# Patient Record
Sex: MALE | Race: WHITE | NOT HISPANIC OR LATINO | URBAN - NONMETROPOLITAN AREA
[De-identification: names, ages, dates, MRNs, and addresses within clinical notes are randomized per-mention and may not be internally consistent; named-entity substitution may affect disease eponyms.]

---

## 2024-06-03 RX ORDER — BUSPIRONE HYDROCHLORIDE 10 MG/1
10 TABLET ORAL DAILY
COMMUNITY
Start: 2024-04-05

## 2024-06-03 RX ORDER — IBUPROFEN 200 MG
200 TABLET ORAL EVERY 6 HOURS PRN
COMMUNITY

## 2024-06-03 RX ORDER — HYDROXYZINE HYDROCHLORIDE 25 MG/1
1 TABLET, FILM COATED ORAL EVERY 6 HOURS PRN
COMMUNITY
Start: 2024-04-05

## 2024-06-03 RX ORDER — VALACYCLOVIR HYDROCHLORIDE 1 G/1
1000 TABLET, FILM COATED ORAL 2 TIMES DAILY
COMMUNITY
Start: 2024-03-22

## 2024-06-03 RX ORDER — LIDOCAINE 50 MG/G
1 PATCH TOPICAL EVERY 24 HOURS
COMMUNITY
Start: 2024-05-02 | End: 2024-06-05 | Stop reason: ALTCHOICE

## 2024-06-03 RX ORDER — BUPRENORPHINE HYDROCHLORIDE AND NALOXONE HYDROCHLORIDE DIHYDRATE 8; 2 MG/1; MG/1
1.75 TABLET SUBLINGUAL DAILY
COMMUNITY

## 2024-06-03 RX ORDER — OMEGA-3-ACID ETHYL ESTERS 1 G/1
2 CAPSULE, LIQUID FILLED ORAL 2 TIMES DAILY
COMMUNITY
End: 2024-06-05 | Stop reason: ALTCHOICE

## 2024-06-03 RX ORDER — LISINOPRIL 5 MG/1
5 TABLET ORAL DAILY
COMMUNITY
Start: 2024-03-22

## 2024-06-03 RX ORDER — ACETAMINOPHEN 500 MG
1 TABLET ORAL EVERY 6 HOURS PRN
COMMUNITY

## 2024-06-04 PROBLEM — E29.1 HYPOGONADISM IN MALE: Status: ACTIVE | Noted: 2024-06-04

## 2024-06-05 ENCOUNTER — OFFICE VISIT (OUTPATIENT)
Dept: UROLOGY | Facility: CLINIC | Age: 54
End: 2024-06-05
Payer: COMMERCIAL

## 2024-06-05 VITALS
DIASTOLIC BLOOD PRESSURE: 78 MMHG | HEIGHT: 71 IN | TEMPERATURE: 98.7 F | HEART RATE: 81 BPM | RESPIRATION RATE: 18 BRPM | SYSTOLIC BLOOD PRESSURE: 132 MMHG | BODY MASS INDEX: 31.64 KG/M2 | WEIGHT: 226 LBS | OXYGEN SATURATION: 98 %

## 2024-06-05 DIAGNOSIS — E29.1 HYPOGONADISM IN MALE: Primary | ICD-10-CM

## 2024-06-05 LAB
SL AMB  POCT GLUCOSE, UA: ABNORMAL
SL AMB LEUKOCYTE ESTERASE,UA: ABNORMAL
SL AMB POCT BILIRUBIN,UA: ABNORMAL
SL AMB POCT BLOOD,UA: ABNORMAL
SL AMB POCT CLARITY,UA: CLEAR
SL AMB POCT COLOR,UA: YELLOW
SL AMB POCT KETONES,UA: ABNORMAL
SL AMB POCT NITRITE,UA: ABNORMAL
SL AMB POCT PH,UA: 5.5
SL AMB POCT SPECIFIC GRAVITY,UA: 1.01
SL AMB POCT URINE PROTEIN: ABNORMAL
SL AMB POCT UROBILINOGEN: 0.2

## 2024-06-05 PROCEDURE — 99204 OFFICE O/P NEW MOD 45 MIN: CPT | Performed by: UROLOGY

## 2024-06-05 PROCEDURE — 81003 URINALYSIS AUTO W/O SCOPE: CPT | Performed by: UROLOGY

## 2024-06-05 NOTE — PROGRESS NOTES
UROLOGY PROGRESS NOTE         NAME: Julio Cesar Light  AGE: 54 y.o. SEX: male  : 1970   MRN: [id number]    DATE: 2024  TIME: 10:43 AM    Assessment and Plan      Impression:   1. Hypogonadism in male  -     POCT urine dip auto non-scope  -     Testosterone, Free, Bioavailable and Total, Males (Adult), Immunoassay; Future  -     CBC and differential; Future       Plan: I reviewed the patient's blood work which he had on his phone.  His LH levels are normal.  His free and total testosterone which were done most recently are low normal.  His DHEA levels are low.  He has symptomatic secondary hypogonadism based on the blood work and history.  He also has issues with some erectile dysfunction.  He wants to try testosterone first.  He is already signed up with an online company and has received Depo testosterone.  He wants to see us back in 10 weeks we requested some repeat blood work at that time.  He tells me that his dosage through this other company will be 100 mg weekly with repeat blood work to be done in 1 month.  I also would recommend to him that he use baby aspirin.  He states that he will switch over to prescriptions and management with us after that period of time.  I told him that it is unwise to have management of his T levels by more than 1 physician or group.      Patient also has a right inguinal hernia.  I notified him of this and recommended that he see his family physician in that regard.  He denies any pain related to this    Based on his blood work and physical exam, I recommended he try Clomid first.  Apparently he is read about Clomid and is concerned about potential side effects.  I did explain to him the risks of Depo testosterone supplementation including but not limited to cardiovascular events, blood clots, increased viscosity of blood, testicular atrophy, the need for ongoing blood work and office visits amongst others.  He has an excellent understanding of this.  We will see  "him back in 10 weeks.  He should get a testosterone level in 1 month.  In 10 weeks prior to the visit he should have repeat T and CBC levels.  His prolactin level was normal.  He does not want to trial any ED medication at this point.      Chief Complaint     Chief Complaint   Patient presents with   • New Patient Visit     Discuss testosterone levels. Had his levels done on 3/23/24 those levels were normal. Patient said that he had the levels drawn again recently and has the results with him      History of Present Illness     HPI: Julio Cesar Light is a 54 y.o. year old male who presents with apparent history of low testosterone.  Recent blood work was within the normal range.              The following portions of the patient's history were reviewed and updated as appropriate: allergies, current medications, past family history, past medical history, past social history, past surgical history and problem list.  Past Medical History:   Diagnosis Date   • Chronic hepatitis C (HCC)    • Dyslipidemia    • Hypertension    • Osteoarthritis    • Prediabetes    • Varicose vein of leg     right leg     Past Surgical History:   Procedure Laterality Date   • FRACTURE SURGERY Left     hand   • HERNIA REPAIR     • REPLACEMENT TOTAL KNEE Bilateral      shoulder  Review of Systems     Const: Denies chills, fever and weight loss.  CV: Denies chest pain.  Resp: Denies SOB.  GI: Denies abdominal pain, nausea and vomiting.  : Denies symptoms other than stated above.  Musculo: Denies back pain.    Objective   /78   Pulse 81   Temp 98.7 °F (37.1 °C) (Tympanic)   Resp 18   Ht 5' 11\" (1.803 m)   Wt 103 kg (226 lb)   SpO2 98%   BMI 31.52 kg/m²     Physical Exam  Const: Appears healthy and well developed. No signs of acute distress present.  Resp: Respirations are regular and unlabored.   CV: Rate is regular. Rhythm is regular.  Abdomen: Abdomen is soft, nontender, and nondistended. Kidneys are not palpable.  : External " genitalia are normal.  He has a right inguinal hernia.  The testes are normal in size and consistency the phallus is normal circumcised.  The prostate is 2+ smooth soft and there are no nodules or hard areas.  Psych: Patient's attitude is cooperative. Mood is normal. Affect is normal.    Procedure   Procedures     Current Medications     Current Outpatient Medications:   •  acetaminophen (TYLENOL) 500 mg tablet, Take 1 tablet by mouth every 6 (six) hours as needed, Disp: , Rfl:   •  buprenorphine-naloxone (SUBOXONE) 8-2 mg per SL tablet, Place 1.75 tablets under the tongue daily, Disp: , Rfl:   •  busPIRone (BUSPAR) 10 mg tablet, Take 10 mg by mouth in the morning, Disp: , Rfl:   •  hydrOXYzine HCL (ATARAX) 25 mg tablet, Take 1 tablet by mouth every 6 (six) hours as needed, Disp: , Rfl:   •  ibuprofen (MOTRIN) 200 mg tablet, Take 200 mg by mouth every 6 (six) hours as needed, Disp: , Rfl:   •  lisinopril (ZESTRIL) 5 mg tablet, Take 5 mg by mouth daily, Disp: , Rfl:   •  metFORMIN (GLUCOPHAGE) 500 mg tablet, Take 500 mg by mouth 2 (two) times a day (Patient not taking: Reported on 6/5/2024), Disp: , Rfl:   •  valACYclovir (VALTREX) 1,000 mg tablet, Take 1,000 mg by mouth 2 (two) times a day (Patient not taking: Reported on 6/5/2024), Disp: , Rfl:         Rosalba Roche MD

## 2024-07-05 ENCOUNTER — TELEPHONE (OUTPATIENT)
Dept: UROLOGY | Facility: CLINIC | Age: 54
End: 2024-07-05

## 2024-07-05 NOTE — TELEPHONE ENCOUNTER
Pt returning call regarding ED medication. Let pt know that message was sent to provider and waiting to hear back regarding the medication. Pt understood.

## 2024-07-05 NOTE — TELEPHONE ENCOUNTER
Called to reschedule 8/23/24 appointment with Dr. Roche, appointment rescheduled to 8/14/24 at 1515.    Patient is now asking to be placed on ED medication.

## 2024-07-08 NOTE — TELEPHONE ENCOUNTER
Pt called to see the status of his request for ED medication.    PT would like medication to be called into     [address] [address]  [phone]        Pt call [phone]

## 2024-07-10 DIAGNOSIS — N52.9 ERECTILE DYSFUNCTION, UNSPECIFIED ERECTILE DYSFUNCTION TYPE: Primary | ICD-10-CM

## 2024-07-10 RX ORDER — TADALAFIL 5 MG/1
5 TABLET ORAL DAILY PRN
Qty: 30 TABLET | Refills: 5 | Status: SHIPPED | OUTPATIENT
Start: 2024-07-10

## 2024-07-10 NOTE — TELEPHONE ENCOUNTER
Patient said he 's been calling us since Friday requesting ED medication and it hasn't been called in yet. Would like an update or a call back once it's been called in.    CB: [phone]

## 2024-07-11 ENCOUNTER — TELEPHONE (OUTPATIENT)
Dept: UROLOGY | Facility: CLINIC | Age: 54
End: 2024-07-11

## 2024-07-11 NOTE — TELEPHONE ENCOUNTER
Pt calling to confirm that ED medication was called into the pharmacy for him. Confirmed that   tadalafil (CIALIS) 5 MG tablet  was called in for him. Pt understood and will pick it up at pharmacy. No further action is needed at this time.

## 2024-07-11 NOTE — TELEPHONE ENCOUNTER
----- Message from Cristofer Roche MD sent at 7/10/2024  9:46 PM EDT -----  ED medication sent to patient's pharmacy.  Daily tadalafil discussed at office visit.  Patient had declined trial of that medication at that time.  Occasions were reviewed.  Please instruct him again that he cannot take nitroglycerin based medications with the tadalafil due to a lethal interaction.  This was reviewed with him at the time of his last visit he is not taking nitroglycerin based on his medication list

## 2024-07-27 LAB
BASOPHILS # BLD AUTO: 77 CELLS/UL (ref 0–200)
BASOPHILS NFR BLD AUTO: 1.1 %
EOSINOPHIL # BLD AUTO: 238 CELLS/UL (ref 15–500)
EOSINOPHIL NFR BLD AUTO: 3.4 %
ERYTHROCYTE [DISTWIDTH] IN BLOOD BY AUTOMATED COUNT: 12.6 % (ref 11–15)
HCT VFR BLD AUTO: 46.9 % (ref 38.5–50)
HGB BLD-MCNC: 15.8 G/DL (ref 13.2–17.1)
LYMPHOCYTES # BLD AUTO: 1540 CELLS/UL (ref 850–3900)
LYMPHOCYTES NFR BLD AUTO: 22 %
MCH RBC QN AUTO: 31 PG (ref 27–33)
MCHC RBC AUTO-ENTMCNC: 33.7 G/DL (ref 32–36)
MCV RBC AUTO: 92.1 FL (ref 80–100)
MONOCYTES # BLD AUTO: 861 CELLS/UL (ref 200–950)
MONOCYTES NFR BLD AUTO: 12.3 %
NEUTROPHILS # BLD AUTO: 4284 CELLS/UL (ref 1500–7800)
NEUTROPHILS NFR BLD AUTO: 61.2 %
PLATELET # BLD AUTO: 265 THOUSAND/UL (ref 140–400)
PMV BLD REES-ECKER: 10.6 FL (ref 7.5–12.5)
RBC # BLD AUTO: 5.09 MILLION/UL (ref 4.2–5.8)
WBC # BLD AUTO: 7 THOUSAND/UL (ref 3.8–10.8)

## 2024-07-30 LAB
BASOPHILS # BLD AUTO: 77 CELLS/UL (ref 0–200)
BASOPHILS NFR BLD AUTO: 1.1 %
EOSINOPHIL # BLD AUTO: 238 CELLS/UL (ref 15–500)
EOSINOPHIL NFR BLD AUTO: 3.4 %
ERYTHROCYTE [DISTWIDTH] IN BLOOD BY AUTOMATED COUNT: 12.6 % (ref 11–15)
HCT VFR BLD AUTO: 46.9 % (ref 38.5–50)
HGB BLD-MCNC: 15.8 G/DL (ref 13.2–17.1)
LYMPHOCYTES # BLD AUTO: 1540 CELLS/UL (ref 850–3900)
LYMPHOCYTES NFR BLD AUTO: 22 %
MCH RBC QN AUTO: 31 PG (ref 27–33)
MCHC RBC AUTO-ENTMCNC: 33.7 G/DL (ref 32–36)
MCV RBC AUTO: 92.1 FL (ref 80–100)
MONOCYTES # BLD AUTO: 861 CELLS/UL (ref 200–950)
MONOCYTES NFR BLD AUTO: 12.3 %
NEUTROPHILS # BLD AUTO: 4284 CELLS/UL (ref 1500–7800)
NEUTROPHILS NFR BLD AUTO: 61.2 %
PLATELET # BLD AUTO: 265 THOUSAND/UL (ref 140–400)
PMV BLD REES-ECKER: 10.6 FL (ref 7.5–12.5)
RBC # BLD AUTO: 5.09 MILLION/UL (ref 4.2–5.8)
TESTOST FREE SERPL-MCNC: 124.1 PG/ML (ref 35–155)
TESTOST SERPL-MCNC: 893 NG/DL (ref 250–1100)
WBC # BLD AUTO: 7 THOUSAND/UL (ref 3.8–10.8)

## 2024-08-13 ENCOUNTER — TELEPHONE (OUTPATIENT)
Age: 54
End: 2024-08-13

## 2024-08-13 RX ORDER — TESTOSTERONE CYPIONATE 1000 MG/10ML
100 INJECTION, SOLUTION INTRAMUSCULAR WEEKLY
COMMUNITY
End: 2024-08-14 | Stop reason: SDUPTHER

## 2024-08-13 RX ORDER — PANTOPRAZOLE SODIUM 40 MG/1
40 TABLET, DELAYED RELEASE ORAL DAILY
COMMUNITY
Start: 2023-10-13 | End: 2024-08-14 | Stop reason: ALTCHOICE

## 2024-08-13 RX ORDER — COLCHICINE 0.6 MG/1
0.6 TABLET ORAL
COMMUNITY
Start: 2024-07-02

## 2024-08-13 NOTE — TELEPHONE ENCOUNTER
Pt calling saying he hd a message to come in at 230. No documentation of this.    Pt can come in at 2:30 tomorrow if needed.  Please call pt back and confirm.  Pt stated he will be in either day tomorrow.  Just needs to verify what time.  Pt is scheduled for 3:15      [phone].

## 2024-08-14 ENCOUNTER — OFFICE VISIT (OUTPATIENT)
Dept: UROLOGY | Facility: CLINIC | Age: 54
End: 2024-08-14
Payer: COMMERCIAL

## 2024-08-14 VITALS
TEMPERATURE: 98.5 F | OXYGEN SATURATION: 99 % | SYSTOLIC BLOOD PRESSURE: 146 MMHG | BODY MASS INDEX: 30.8 KG/M2 | RESPIRATION RATE: 18 BRPM | HEART RATE: 102 BPM | HEIGHT: 71 IN | WEIGHT: 220 LBS | DIASTOLIC BLOOD PRESSURE: 80 MMHG

## 2024-08-14 DIAGNOSIS — R35.1 NOCTURIA: ICD-10-CM

## 2024-08-14 DIAGNOSIS — E29.1 HYPOGONADISM IN MALE: Primary | ICD-10-CM

## 2024-08-14 PROCEDURE — 99214 OFFICE O/P EST MOD 30 MIN: CPT | Performed by: UROLOGY

## 2024-08-14 RX ORDER — TESTOSTERONE CYPIONATE 1000 MG/10ML
50 INJECTION, SOLUTION INTRAMUSCULAR 2 TIMES WEEKLY
Qty: 10 ML | Refills: 3 | Status: SHIPPED | OUTPATIENT
Start: 2024-08-15

## 2024-08-14 RX ORDER — SILDENAFIL 50 MG/1
50 TABLET, FILM COATED ORAL DAILY PRN
Qty: 30 TABLET | Refills: 3 | Status: SHIPPED | OUTPATIENT
Start: 2024-08-14

## 2024-08-14 NOTE — PROGRESS NOTES
UROLOGY PROGRESS NOTE         NAME: Julio Cesar Light  AGE: 54 y.o. SEX: male  : 1970   MRN: [id number]    DATE: 2024  TIME: 3:30 PM    Assessment and Plan      Impression:   1. Hypogonadism in male  -     testosterone cypionate (DEPO-TESTOSTERONE) 100 mg/mL IM injection; Inject 0.5 mL (50 mg total) into a muscle 2 (two) times a week  -     sildenafil (VIAGRA) 50 MG tablet; Take 1 tablet (50 mg total) by mouth daily as needed for erectile dysfunction  -     Testosterone, free, total; Future  -     PSA Total, Diagnostic; Future  -     CBC and differential; Future  2. Nocturia  -     PSA Total, Diagnostic; Future  -     Testosterone, free, total; Future  -     PSA Total, Diagnostic; Future  -     CBC and differential; Future       Plan: Patient will continue his testosterone management for hypogonadism here.  We are sending in prescription for him to continue at 50 mg twice a week Depo testosterone.  His levels were satisfactory at this dosage and interval.  He provided evidence of PSA which was well within the normal range at 0.2 and previous prolactin level which was also normal.  These were done through an outside provider at [facility].  He understands the risks and limitations of testosterone supplementation including but not limited to stroke MRI thromboembolic events DVT hypercoagulable states testicular atrophy amongst others.  He has an excellent understanding of this and accepts those risks.  He will be following up in 6 months with repeat blood work.  In addition he would prefer to use sildenafil 50 to 100 mg on an as-needed basis every other day.  He understands the use of that medication and its potential side effects.  He understands that he must discontinue his daily tadalafil in order to take his sildenafil.  We discontinued his tadalafil.  He understands that he cannot take both medications together.  He understands the potentially lethal interaction between nitrates and the above ED  "medications.  His questions were answered.      Chief Complaint     Chief Complaint   Patient presents with   • Follow-up     History of Present Illness     HPI: Julio Cesar Light is a 54 y.o. year old male who presents with erectile dysfunction.  Patient placed on tadalafil last visit.  Patient receives testosterone supplementation through other provider.  His testosterone levels were within normal limits.  Total testosterone was 893 CBC was normal.  Last PSA I have available was from 2022 and was 0.23.  We are not managing his testosterone.  We are seeing him for ED.  He is a diabetic.  He is on metformin.  He is also on lisinopril.              The following portions of the patient's history were reviewed and updated as appropriate: allergies, current medications, past family history, past medical history, past social history, past surgical history and problem list.  Past Medical History:   Diagnosis Date   • Chronic hepatitis C (HCC)    • Dyslipidemia    • Hypertension    • Osteoarthritis    • Prediabetes    • Varicose vein of leg     right leg     Past Surgical History:   Procedure Laterality Date   • FRACTURE SURGERY Left     hand   • HERNIA REPAIR     • REPLACEMENT TOTAL KNEE Bilateral      shoulder  Review of Systems     Const: Denies chills, fever and weight loss.  CV: Denies chest pain.  Resp: Denies SOB.  GI: Denies abdominal pain, nausea and vomiting.  : Denies symptoms other than stated above.  Musculo: Denies back pain.    Objective   /80   Pulse 102   Temp 98.5 °F (36.9 °C) (Tympanic)   Resp 18   Ht 5' 11\" (1.803 m)   Wt 99.8 kg (220 lb)   SpO2 99%   BMI 30.68 kg/m²     Physical Exam  Const: Appears healthy and well developed. No signs of acute distress present.  Resp: Respirations are regular and unlabored.   CV: Rate is regular. Rhythm is regular.  Abdomen: Abdomen is soft, nontender, and nondistended. Kidneys are not palpable.  : Not performed  Psych: Patient's attitude is " cooperative. Mood is normal. Affect is normal.    Procedure   Procedures     Current Medications     Current Outpatient Medications:   •  acetaminophen (TYLENOL) 500 mg tablet, Take 1 tablet by mouth every 6 (six) hours as needed, Disp: , Rfl:   •  buprenorphine-naloxone (SUBOXONE) 8-2 mg per SL tablet, Place 1.75 tablets under the tongue daily, Disp: , Rfl:   •  busPIRone (BUSPAR) 10 mg tablet, Take 10 mg by mouth in the morning, Disp: , Rfl:   •  colchicine (COLCRYS) 0.6 mg tablet, Take 0.6 mg by mouth, Disp: , Rfl:   •  hydrOXYzine HCL (ATARAX) 25 mg tablet, Take 1 tablet by mouth every 6 (six) hours as needed, Disp: , Rfl:   •  ibuprofen (MOTRIN) 200 mg tablet, Take 200 mg by mouth every 6 (six) hours as needed, Disp: , Rfl:   •  lisinopril (ZESTRIL) 5 mg tablet, Take 5 mg by mouth daily, Disp: , Rfl:   •  metFORMIN (GLUCOPHAGE) 500 mg tablet, Take 500 mg by mouth 2 (two) times a day, Disp: , Rfl:   •  sildenafil (VIAGRA) 50 MG tablet, Take 1 tablet (50 mg total) by mouth daily as needed for erectile dysfunction, Disp: 30 tablet, Rfl: 3  •  [START ON 8/15/2024] testosterone cypionate (DEPO-TESTOSTERONE) 100 mg/mL IM injection, Inject 0.5 mL (50 mg total) into a muscle 2 (two) times a week, Disp: 10 mL, Rfl: 3  •  valACYclovir (VALTREX) 1,000 mg tablet, Take 1,000 mg by mouth 2 (two) times a day, Disp: , Rfl:         Rosalba Roche MD

## 2024-09-12 ENCOUNTER — TELEPHONE (OUTPATIENT)
Dept: UROLOGY | Facility: CLINIC | Age: 54
End: 2024-09-12

## 2024-09-12 DIAGNOSIS — N52.9 ERECTILE DYSFUNCTION, UNSPECIFIED ERECTILE DYSFUNCTION TYPE: ICD-10-CM

## 2024-09-12 DIAGNOSIS — E29.1 HYPOGONADISM IN MALE: Primary | ICD-10-CM

## 2024-09-12 NOTE — TELEPHONE ENCOUNTER
Patient stopped in the office and wants to switch from Viagra to Cialis. He stated that the Viagra is too expensive. Will forward to the provider pool due to Dr. Roche is out of the office. Patient was last seen by Dr. Roche on 8/14/24.

## 2024-09-18 RX ORDER — TADALAFIL 10 MG/1
TABLET ORAL
Qty: 30 TABLET | Refills: 3 | Status: SHIPPED | OUTPATIENT
Start: 2024-09-18

## 2024-09-18 NOTE — TELEPHONE ENCOUNTER
Please ensure patient does not use cialis with viagra.  It is unsafe.  He can use 10mg cialis on demand, when needed.  If inadequate response he can try two tablets to equal 20mg with his next encounter.

## 2024-09-18 NOTE — TELEPHONE ENCOUNTER
VM left for pt to call us back to relay the  message from Chrissy Bar N.P. If he calls back, please tell him her message.

## 2024-09-18 NOTE — TELEPHONE ENCOUNTER
Patient called back, informed him of information Chrissy provided. I informed patient that the medication was sent over to his pharmacy. Patient very appreciative of information.

## 2025-01-04 DIAGNOSIS — N52.9 ERECTILE DYSFUNCTION, UNSPECIFIED ERECTILE DYSFUNCTION TYPE: ICD-10-CM

## 2025-01-04 DIAGNOSIS — E29.1 HYPOGONADISM IN MALE: ICD-10-CM

## 2025-01-06 RX ORDER — TADALAFIL 10 MG/1
TABLET ORAL
Qty: 30 TABLET | Refills: 3 | Status: SHIPPED | OUTPATIENT
Start: 2025-01-06

## 2025-01-28 ENCOUNTER — TELEPHONE (OUTPATIENT)
Dept: UROLOGY | Facility: CLINIC | Age: 55
End: 2025-01-28

## 2025-01-28 NOTE — TELEPHONE ENCOUNTER
Called pt to r/s dos 2/21/25 with Dr. Roche. Could not leave a message mailbox is full.,  cancelled appt and sent a letter in the mail.

## 2025-01-28 NOTE — TELEPHONE ENCOUNTER
Patient called back to reschedule appointment but there's nothing available until April and he wants to come in next month. He said he could come in earlier than 2/21 if we have anything, but he'll need a few days notice to get blood work done. He also asked that you call back on Friday since he's off and will be able to take the call.    CB: [phone]

## 2025-01-29 ENCOUNTER — PATIENT MESSAGE (OUTPATIENT)
Dept: UROLOGY | Facility: CLINIC | Age: 55
End: 2025-01-29

## 2025-01-30 NOTE — TELEPHONE ENCOUNTER
Pt called back and stated he will take either of those offered appointments, he would prefer the 4pm but stated he really needs to get in so he will do the 3:30pm if needed. Please advise.

## 2025-02-08 LAB
BASOPHILS # BLD AUTO: 59 CELLS/UL (ref 0–200)
BASOPHILS NFR BLD AUTO: 0.9 %
EOSINOPHIL # BLD AUTO: 241 CELLS/UL (ref 15–500)
EOSINOPHIL NFR BLD AUTO: 3.7 %
ERYTHROCYTE [DISTWIDTH] IN BLOOD BY AUTOMATED COUNT: 12.5 % (ref 11–15)
HCT VFR BLD AUTO: 50.5 % (ref 38.5–50)
HGB BLD-MCNC: 17.3 G/DL (ref 13.2–17.1)
LYMPHOCYTES # BLD AUTO: 1450 CELLS/UL (ref 850–3900)
LYMPHOCYTES NFR BLD AUTO: 22.3 %
MCH RBC QN AUTO: 31.2 PG (ref 27–33)
MCHC RBC AUTO-ENTMCNC: 34.3 G/DL (ref 32–36)
MCV RBC AUTO: 91.2 FL (ref 80–100)
MONOCYTES # BLD AUTO: 592 CELLS/UL (ref 200–950)
MONOCYTES NFR BLD AUTO: 9.1 %
NEUTROPHILS # BLD AUTO: 4160 CELLS/UL (ref 1500–7800)
NEUTROPHILS NFR BLD AUTO: 64 %
PLATELET # BLD AUTO: 270 THOUSAND/UL (ref 140–400)
PMV BLD REES-ECKER: 10.1 FL (ref 7.5–12.5)
PSA SERPL-MCNC: 0.33 NG/ML
RBC # BLD AUTO: 5.54 MILLION/UL (ref 4.2–5.8)
WBC # BLD AUTO: 6.5 THOUSAND/UL (ref 3.8–10.8)

## 2025-02-12 LAB
BASOPHILS # BLD AUTO: 59 CELLS/UL (ref 0–200)
BASOPHILS NFR BLD AUTO: 0.9 %
EOSINOPHIL # BLD AUTO: 241 CELLS/UL (ref 15–500)
EOSINOPHIL NFR BLD AUTO: 3.7 %
ERYTHROCYTE [DISTWIDTH] IN BLOOD BY AUTOMATED COUNT: 12.5 % (ref 11–15)
HCT VFR BLD AUTO: 50.5 % (ref 38.5–50)
HGB BLD-MCNC: 17.3 G/DL (ref 13.2–17.1)
LYMPHOCYTES # BLD AUTO: 1450 CELLS/UL (ref 850–3900)
LYMPHOCYTES NFR BLD AUTO: 22.3 %
MCH RBC QN AUTO: 31.2 PG (ref 27–33)
MCHC RBC AUTO-ENTMCNC: 34.3 G/DL (ref 32–36)
MCV RBC AUTO: 91.2 FL (ref 80–100)
MONOCYTES # BLD AUTO: 592 CELLS/UL (ref 200–950)
MONOCYTES NFR BLD AUTO: 9.1 %
NEUTROPHILS # BLD AUTO: 4160 CELLS/UL (ref 1500–7800)
NEUTROPHILS NFR BLD AUTO: 64 %
PLATELET # BLD AUTO: 270 THOUSAND/UL (ref 140–400)
PMV BLD REES-ECKER: 10.1 FL (ref 7.5–12.5)
PSA SERPL-MCNC: 0.33 NG/ML
RBC # BLD AUTO: 5.54 MILLION/UL (ref 4.2–5.8)
TESTOST FREE SERPL-MCNC: 96 PG/ML (ref 35–155)
TESTOST SERPL-MCNC: 640 NG/DL (ref 250–1100)
WBC # BLD AUTO: 6.5 THOUSAND/UL (ref 3.8–10.8)

## 2025-02-16 ENCOUNTER — RESULTS FOLLOW-UP (OUTPATIENT)
Dept: UROLOGY | Facility: CLINIC | Age: 55
End: 2025-02-16

## 2025-02-17 NOTE — RESULT ENCOUNTER NOTE
Please have patient hydrate, increase fluids.  Repeat CBC in 1 month.  Please ask patient to take baby aspirin 3 times a week-81 mg.    Thank you

## 2025-02-17 NOTE — TELEPHONE ENCOUNTER
----- Message from Cristofer Roche MD sent at 2/16/2025 10:16 PM EST -----  Please have patient hydrate, increase fluids.  Repeat CBC in 1 month.  Please ask patient to take baby aspirin 3 times a week-81 mg.    Thank you  
Unable to reach pt or leave voicemail. My chart message sent to pt.   
English

## 2025-02-25 ENCOUNTER — OFFICE VISIT (OUTPATIENT)
Dept: UROLOGY | Facility: CLINIC | Age: 55
End: 2025-02-25
Payer: COMMERCIAL

## 2025-02-25 VITALS
HEIGHT: 71 IN | WEIGHT: 217 LBS | OXYGEN SATURATION: 98 % | TEMPERATURE: 98.5 F | DIASTOLIC BLOOD PRESSURE: 80 MMHG | BODY MASS INDEX: 30.38 KG/M2 | HEART RATE: 93 BPM | SYSTOLIC BLOOD PRESSURE: 152 MMHG

## 2025-02-25 DIAGNOSIS — R35.1 NOCTURIA: Primary | ICD-10-CM

## 2025-02-25 DIAGNOSIS — E29.1 HYPOGONADISM IN MALE: ICD-10-CM

## 2025-02-25 DIAGNOSIS — R31.29 MICROHEMATURIA: ICD-10-CM

## 2025-02-25 LAB
SL AMB  POCT GLUCOSE, UA: ABNORMAL
SL AMB LEUKOCYTE ESTERASE,UA: ABNORMAL
SL AMB POCT BILIRUBIN,UA: ABNORMAL
SL AMB POCT BLOOD,UA: ABNORMAL
SL AMB POCT CLARITY,UA: CLEAR
SL AMB POCT COLOR,UA: YELLOW
SL AMB POCT KETONES,UA: ABNORMAL
SL AMB POCT NITRITE,UA: ABNORMAL
SL AMB POCT PH,UA: 6
SL AMB POCT SPECIFIC GRAVITY,UA: 1.01
SL AMB POCT URINE PROTEIN: ABNORMAL
SL AMB POCT UROBILINOGEN: 0.2

## 2025-02-25 PROCEDURE — 99213 OFFICE O/P EST LOW 20 MIN: CPT | Performed by: UROLOGY

## 2025-02-25 PROCEDURE — 81003 URINALYSIS AUTO W/O SCOPE: CPT | Performed by: UROLOGY

## 2025-02-25 RX ORDER — ALLOPURINOL 100 MG/1
100 TABLET ORAL EVERY MORNING
COMMUNITY

## 2025-02-25 RX ORDER — LIDOCAINE 50 MG/G
1 PATCH TOPICAL EVERY 24 HOURS
COMMUNITY
Start: 2024-10-11

## 2025-02-25 NOTE — PROGRESS NOTES
UROLOGY PROGRESS NOTE         NAME: Julio Cesar Light  AGE: 55 y.o. SEX: male  : 1970   MRN: [id number]    DATE: 2025  TIME: 4:28 PM    Assessment and Plan      Impression:   1. Hypogonadism in male  -     POCT urine dip auto non-scope  -     CBC and differential; Future  -     Testosterone, free, total; Future  2. Nocturia  3. Microhematuria  -     US kidney and bladder with pvr; Future; Expected date: 2025       Plan: Patient's hemoglobin was mildly elevated.  I have asked him to hydrate and repeat CBC in 1 month.  If it remains elevated, I would suggest that he change his regimen to Depo testosterone 50 mg every 5 days.  He had some mild microhematuria noted today I ordered a renal ultrasound.  Unclear as to whether or not he has had any evaluation for this in the past.  He denies any gross hematuria and is a non-smoker.  He has had some back pain more to the left.  He is agreeable to the ultrasound.  Any additional evaluation will depend on the ultrasound result and a repeat urinalysis.  He should have follow-up blood work in about 6 months.       Chief Complaint     Chief Complaint   Patient presents with   • Follow-up     History of Present Illness     HPI: Julio Cesar Light is a 55 y.o. year old male who presents with history of hypogonadism.  Patient with mildly elevated hemoglobin.  Testosterone levels satisfactory and PSA satisfactory on recent blood work.  Is on Depo testosterone.  Had recommended hydration and repeat of CBC in 1 month.  Presently uses 50 mg Depo testosterone twice a week.              The following portions of the patient's history were reviewed and updated as appropriate: allergies, current medications, past family history, past medical history, past social history, past surgical history and problem list.  Past Medical History:   Diagnosis Date   • Chronic hepatitis C (HCC)    • Dyslipidemia    • Hypertension    • Osteoarthritis    • Prediabetes    • Varicose vein of  "leg     right leg     Past Surgical History:   Procedure Laterality Date   • FRACTURE SURGERY Left     hand   • HERNIA REPAIR     • REPLACEMENT TOTAL KNEE Bilateral      shoulder  Review of Systems     Const: Denies chills, fever and weight loss.  CV: Denies chest pain.  Resp: Denies SOB.  GI: Denies abdominal pain, nausea and vomiting.  : Denies symptoms other than stated above.  Musculo: Denies back pain.    Objective   /80   Pulse 93   Temp 98.5 °F (36.9 °C)   Ht 5' 11\" (1.803 m)   Wt 98.4 kg (217 lb)   SpO2 98%   BMI 30.27 kg/m²     Physical Exam  Const: Appears healthy and well developed. No signs of acute distress present.  Resp: Respirations are regular and unlabored.   CV: Rate is regular. Rhythm is regular.  Abdomen: Abdomen is soft, nontender, and nondistended. Kidneys are not palpable.  : Not performed  Psych: Patient's attitude is cooperative. Mood is normal. Affect is normal.    Procedure   Procedures     Current Medications     Current Outpatient Medications:   •  allopurinol (ZYLOPRIM) 100 mg tablet, Take 100 mg by mouth every morning, Disp: , Rfl:   •  buprenorphine-naloxone (SUBOXONE) 8-2 mg per SL tablet, Place 1.75 tablets under the tongue daily, Disp: , Rfl:   •  busPIRone (BUSPAR) 10 mg tablet, Take 10 mg by mouth in the morning, Disp: , Rfl:   •  colchicine (COLCRYS) 0.6 mg tablet, Take 0.6 mg by mouth, Disp: , Rfl:   •  hydrOXYzine HCL (ATARAX) 25 mg tablet, Take 1 tablet by mouth every 6 (six) hours as needed, Disp: , Rfl:   •  lidocaine (LIDODERM) 5 %, Place 1 patch on the skin every 24 hours, Disp: , Rfl:   •  lisinopril (ZESTRIL) 5 mg tablet, Take 5 mg by mouth daily, Disp: , Rfl:   •  metFORMIN (GLUCOPHAGE) 500 mg tablet, Take 500 mg by mouth 2 (two) times a day, Disp: , Rfl:   •  tadalafil (CIALIS) 10 MG tablet, take 1 tablet by mouth 1 hour prior to intercourse, Disp: 30 tablet, Rfl: 3  •  testosterone cypionate (DEPO-TESTOSTERONE) 100 mg/mL IM injection, Inject 0.5 mL " (50 mg total) into a muscle 2 (two) times a week, Disp: 10 mL, Rfl: 3  •  valACYclovir (VALTREX) 1,000 mg tablet, Take 1,000 mg by mouth 2 (two) times a day, Disp: , Rfl:         Rosalba Roche MD

## 2025-03-07 DIAGNOSIS — E29.1 HYPOGONADISM IN MALE: ICD-10-CM

## 2025-03-07 NOTE — TELEPHONE ENCOUNTER
Medication:  PDMP  02/08/2025 12/18/2024 Buprenorphine-naloxone (Tablet) 35.0 23 8 MG-2 MG NA ROXANA MOON [email or web address] [facility] [employer] Insurance 2 / 2 [place]   1 [id number] 01/16/2025 12/18/2024 Buprenorphine-naloxone (Tablet) 35.0 23 8 MG-2 MG [email or web address] [email or web address] [facility] Commercial Insurance 1 / 2 PA   1 [id number] 12/31/2024 08/14/2024 Testosterone Cypionate (Oil) 10.0 70 100 MG/1 ML NA ANITRA [facility]INALDI [facility]  Refill must be reviewed and completed by the office or provider. The refill is unable to be approved or denied by the medication management team.

## 2025-03-11 RX ORDER — TESTOSTERONE CYPIONATE 1000 MG/10ML
INJECTION, SOLUTION INTRAMUSCULAR
Qty: 10 ML | OUTPATIENT
Start: 2025-03-11

## 2025-03-11 NOTE — TELEPHONE ENCOUNTER
Patient last seen by you February 25,2025. Noted to have mildly elevated hgb.  You ordered repeat labs for one month.  These are not done yet since this time frame has not elapsed.  Received refill request for testosterone.  Would you like to delay refill while awaiting labs?

## 2025-03-11 NOTE — TELEPHONE ENCOUNTER
Patient called in upset asking why his refill for  testosterone cypionate (DEPO-TESTOSTERONE) 100 mg/mL IM injection has not been refilled as of yet. Patient states he will have blood work done this Friday. Please review and reach out to Patient for further assistance,

## 2025-03-13 ENCOUNTER — TELEPHONE (OUTPATIENT)
Age: 55
End: 2025-03-13

## 2025-03-13 RX ORDER — TESTOSTERONE CYPIONATE 1000 MG/10ML
50 INJECTION, SOLUTION INTRAMUSCULAR 2 TIMES WEEKLY
Qty: 10 ML | Refills: 1 | Status: SHIPPED | OUTPATIENT
Start: 2025-03-13

## 2025-03-13 NOTE — TELEPHONE ENCOUNTER
Testosterone refilled at prior dose dispensed.  Please let patient know Dr. Roche is aware of the need to repeat his labs. He will reach out with dose adjustments if needed.

## 2025-03-13 NOTE — TELEPHONE ENCOUNTER
PA for TESTOSTERONE 100 MG  APPROVED     Date(s) approved March 13, 2025 to March 13, 2026             Patient advised by          [x][facility] Message  []Phone call   [x]LMOM  []L/M to call office as no active Communication consent on file  []Unable to leave detailed message as VM not approved on Communication consent       Pharmacy advised by    [x]Fax  []Phone call  []Secure Chat         Approval letter scanned into Media Yes

## 2025-03-13 NOTE — TELEPHONE ENCOUNTER
PA for TESTOSTERONE  MG SUBMITTED to [employer]    via      [x][employer]-Case ID # [id number]       [x]PA sent as URGENT    All office notes, labs and other pertaining documents and studies sent. Clinical questions answered. Awaiting determination from insurance company.     Turnaround time for your insurance to make a decision on your Prior Authorization can take 7-21 business days.

## 2025-03-13 NOTE — TELEPHONE ENCOUNTER
Patient called stating he received a call from the office. He stated he is at work. Message relayed to pt as per encounter below.  He stated he will check his message on my chart.

## 2025-03-15 LAB
APPEARANCE UR: CLEAR
BASOPHILS # BLD AUTO: 82 CELLS/UL (ref 0–200)
BASOPHILS NFR BLD AUTO: 1.7 %
BILIRUB UR QL STRIP: NEGATIVE
COLOR UR: YELLOW
EOSINOPHIL # BLD AUTO: 437 CELLS/UL (ref 15–500)
EOSINOPHIL NFR BLD AUTO: 9.1 %
ERYTHROCYTE [DISTWIDTH] IN BLOOD BY AUTOMATED COUNT: 12.2 % (ref 11–15)
GLUCOSE UR QL STRIP: NEGATIVE
HCT VFR BLD AUTO: 49.6 % (ref 38.5–50)
HGB BLD-MCNC: 16.9 G/DL (ref 13.2–17.1)
HGB UR QL STRIP: NEGATIVE
KETONES UR QL STRIP: NEGATIVE
LEUKOCYTE ESTERASE UR QL STRIP: NEGATIVE
LYMPHOCYTES # BLD AUTO: 1430 CELLS/UL (ref 850–3900)
LYMPHOCYTES NFR BLD AUTO: 29.8 %
MCH RBC QN AUTO: 31.4 PG (ref 27–33)
MCHC RBC AUTO-ENTMCNC: 34.1 G/DL (ref 32–36)
MCV RBC AUTO: 92.2 FL (ref 80–100)
MONOCYTES # BLD AUTO: 566 CELLS/UL (ref 200–950)
MONOCYTES NFR BLD AUTO: 11.8 %
NEUTROPHILS # BLD AUTO: 2285 CELLS/UL (ref 1500–7800)
NEUTROPHILS NFR BLD AUTO: 47.6 %
NITRITE UR QL STRIP: NEGATIVE
PH UR STRIP: 7 [PH] (ref 5–8)
PLATELET # BLD AUTO: 258 THOUSAND/UL (ref 140–400)
PMV BLD REES-ECKER: 10.2 FL (ref 7.5–12.5)
PROT UR QL STRIP: NEGATIVE
RBC # BLD AUTO: 5.38 MILLION/UL (ref 4.2–5.8)
SP GR UR STRIP: 1 (ref 1–1.03)
WBC # BLD AUTO: 4.8 THOUSAND/UL (ref 3.8–10.8)

## 2025-03-22 LAB
APPEARANCE UR: CLEAR
BASOPHILS # BLD AUTO: 82 CELLS/UL (ref 0–200)
BASOPHILS NFR BLD AUTO: 1.7 %
BILIRUB UR QL STRIP: NEGATIVE
COLOR UR: YELLOW
EOSINOPHIL # BLD AUTO: 437 CELLS/UL (ref 15–500)
EOSINOPHIL NFR BLD AUTO: 9.1 %
ERYTHROCYTE [DISTWIDTH] IN BLOOD BY AUTOMATED COUNT: 12.2 % (ref 11–15)
GLUCOSE UR QL STRIP: NEGATIVE
HCT VFR BLD AUTO: 49.6 % (ref 38.5–50)
HGB BLD-MCNC: 16.9 G/DL (ref 13.2–17.1)
HGB UR QL STRIP: NEGATIVE
KETONES UR QL STRIP: NEGATIVE
LEUKOCYTE ESTERASE UR QL STRIP: NEGATIVE
LYMPHOCYTES # BLD AUTO: 1430 CELLS/UL (ref 850–3900)
LYMPHOCYTES NFR BLD AUTO: 29.8 %
MCH RBC QN AUTO: 31.4 PG (ref 27–33)
MCHC RBC AUTO-ENTMCNC: 34.1 G/DL (ref 32–36)
MCV RBC AUTO: 92.2 FL (ref 80–100)
MONOCYTES # BLD AUTO: 566 CELLS/UL (ref 200–950)
MONOCYTES NFR BLD AUTO: 11.8 %
NEUTROPHILS # BLD AUTO: 2285 CELLS/UL (ref 1500–7800)
NEUTROPHILS NFR BLD AUTO: 47.6 %
NITRITE UR QL STRIP: NEGATIVE
PH UR STRIP: 7 [PH] (ref 5–8)
PLATELET # BLD AUTO: 258 THOUSAND/UL (ref 140–400)
PMV BLD REES-ECKER: 10.2 FL (ref 7.5–12.5)
PROT UR QL STRIP: NEGATIVE
RBC # BLD AUTO: 5.38 MILLION/UL (ref 4.2–5.8)
SP GR UR STRIP: 1 (ref 1–1.03)
TESTOST FREE SERPL-MCNC: 79.6 PG/ML (ref 35–155)
TESTOST SERPL-MCNC: 517 NG/DL (ref 250–1100)
WBC # BLD AUTO: 4.8 THOUSAND/UL (ref 3.8–10.8)

## 2025-04-11 ENCOUNTER — HOSPITAL ENCOUNTER (OUTPATIENT)
Dept: ULTRASOUND IMAGING | Facility: HOSPITAL | Age: 55
End: 2025-04-11
Attending: UROLOGY
Payer: COMMERCIAL

## 2025-04-11 DIAGNOSIS — R31.29 MICROHEMATURIA: ICD-10-CM

## 2025-04-11 PROCEDURE — 76770 US EXAM ABDO BACK WALL COMP: CPT

## 2025-07-29 ENCOUNTER — PATIENT MESSAGE (OUTPATIENT)
Dept: UROLOGY | Facility: CLINIC | Age: 55
End: 2025-07-29

## 2025-07-31 ENCOUNTER — TELEPHONE (OUTPATIENT)
Dept: UROLOGY | Facility: CLINIC | Age: 55
End: 2025-07-31